# Patient Record
Sex: MALE | Race: WHITE | URBAN - METROPOLITAN AREA
[De-identification: names, ages, dates, MRNs, and addresses within clinical notes are randomized per-mention and may not be internally consistent; named-entity substitution may affect disease eponyms.]

---

## 2024-02-27 ENCOUNTER — APPOINTMENT (OUTPATIENT)
Dept: OPHTHALMOLOGY | Facility: CLINIC | Age: 74
End: 2024-02-27
Payer: MEDICARE

## 2024-03-11 ENCOUNTER — OFFICE VISIT (OUTPATIENT)
Dept: NEUROSURGERY | Facility: HOSPITAL | Age: 74
End: 2024-03-11
Payer: MEDICARE

## 2024-03-11 VITALS
WEIGHT: 191.8 LBS | TEMPERATURE: 97.8 F | HEART RATE: 74 BPM | BODY MASS INDEX: 27.46 KG/M2 | HEIGHT: 70 IN | SYSTOLIC BLOOD PRESSURE: 154 MMHG | DIASTOLIC BLOOD PRESSURE: 90 MMHG

## 2024-03-11 DIAGNOSIS — D35.2 PITUITARY ADENOMA (MULTI): Primary | ICD-10-CM

## 2024-03-11 PROCEDURE — 99213 OFFICE O/P EST LOW 20 MIN: CPT | Performed by: NURSE PRACTITIONER

## 2024-03-11 PROCEDURE — 99203 OFFICE O/P NEW LOW 30 MIN: CPT | Performed by: NURSE PRACTITIONER

## 2024-03-11 RX ORDER — ASPIRIN 81 MG/1
TABLET ORAL DAILY
COMMUNITY

## 2024-03-11 RX ORDER — AMLODIPINE BESYLATE 5 MG/1
5 TABLET ORAL DAILY
COMMUNITY

## 2024-03-11 RX ORDER — ATORVASTATIN CALCIUM 20 MG/1
20 TABLET, FILM COATED ORAL DAILY
COMMUNITY

## 2024-03-11 RX ORDER — MULTIVIT-MIN/IRON FUM/FOLIC AC 7.5 MG-4
1 TABLET ORAL DAILY
COMMUNITY

## 2024-03-11 NOTE — PROGRESS NOTES
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 Given findings patient was referred to neurosurgery and presents to clinic for NPV.     Has not been seen by endocrinology. He continues to follow with ophthalmology at [facility] Dr. Anne and has an upcoming appointment. No blurred, double vision, or peripheral vision loss. Intermittent headaches that typically do not linger but had one about a month ago that took over a day to go away. Denies any heat or cold intolerance. No gait instability. No lactation or hand growth / feet.             Objective      Vitals:   /90   Pulse 74   Temp 36.6 °C (97.8 °F)   Ht 1.778 m (5' 10\")   Wt 87 kg (191 lb 12.8 oz)   BMI 27.52 kg/m²         Physical Exam:    A&Ox3   Fluent speech   EOMI; FC x 4; PERRLA; right eye with mild erythema to conjunctiva    GUERRERO; strength 5/5; no drift   SILT   Gait steady   Face and shoulder shrug symmetrical     Relevant Results:    MRI Orbit / Face/ Neck 02/23/24: T1 hypointense, T2 hypointense heterogeneously enhancing mass of the sella involving the pituitary measuring 1.3 x 1.2 x 1.6cm and abuts the R cavernous sinus with no OC involvement.     MRV 02/23/24: Patent superior sagittal sinus, transverse, and sigmoid sinus.           Assessment & Plan      Diagnosis:  Diagnoses and all orders for this visit:  Pituitary adenoma (CMS/HCC)  -     Referral to Endocrinology; Future  -     MR sella w and wo IV contrast; Future        Provider Impressions:    Patient is a 73-year-old male presenting for evaluation after he was incidentally found to have a " pituitary adenoma while undergoing surveillance monitoring post CEA. CTA with concern for enlarge sella / pituitary gland. MRI orbits w/wo with a 1.3 x 1.2 x 1.6cm pituitary macroadenoma. Patient has a follow up with his ophthalmologist upcoming. Will request records from visit and ensure VF are being completed. If no concern for vision loss will plan for continued observation. He will be due for a repeat MRI sella protocol in 3 months to assess for stability and growth. In the meantime will have patient establish with endocrinology for formal workup. Plan discussed with patient and wife who were in agreement. All questions answered.         Medical History     No past medical history on file.  No past surgical history on file.     No family history on file.  Allergies   Allergen Reactions    Sudafed [Pseudoephedrine Hcl] Rash     Current Outpatient Medications   Medication Instructions    amLODIPine (NORVASC) 5 mg, oral, Daily    aspirin 81 mg EC tablet oral, Daily    atorvastatin (LIPITOR) 20 mg, oral, Daily    multivitamin with minerals tablet 1 tablet, oral, Daily

## 2024-03-11 NOTE — PATIENT INSTRUCTIONS
Welcome to Sylvia Mcmahon, CNP clinic. We are here to assist you through your Neurological Surgery care at [facility]. My office number is [phone]. This number is the most direct way to communicate with the office. The office fax number is [phone].     A MRI Sella w/wo  was ordered during your visit today. Please call [phone] to assist you in scheduling your imaging.  This will be due in May 2024.     You have been referred to an endocrinologist for further workup. We will assist you with scheduling this appointment as well as your MRI.

## 2024-03-13 ENCOUNTER — TELEPHONE (OUTPATIENT)
Dept: PRIMARY CARE | Facility: CLINIC | Age: 74
End: 2024-03-13
Payer: MEDICARE

## 2024-05-22 ENCOUNTER — HOSPITAL ENCOUNTER (OUTPATIENT)
Dept: RADIOLOGY | Facility: HOSPITAL | Age: 74
Discharge: HOME | End: 2024-05-22
Payer: MEDICARE

## 2024-05-22 VITALS — BODY MASS INDEX: 27.12 KG/M2 | WEIGHT: 189 LBS

## 2024-05-22 DIAGNOSIS — D35.2 PITUITARY ADENOMA (MULTI): ICD-10-CM

## 2024-05-22 PROCEDURE — A9575 INJ GADOTERATE MEGLUMI 0.1ML: HCPCS | Performed by: NURSE PRACTITIONER

## 2024-05-22 PROCEDURE — 2550000001 HC RX 255 CONTRASTS: Performed by: NURSE PRACTITIONER

## 2024-05-22 PROCEDURE — 70553 MRI BRAIN STEM W/O & W/DYE: CPT

## 2024-05-22 PROCEDURE — 70553 MRI BRAIN STEM W/O & W/DYE: CPT | Performed by: RADIOLOGY

## 2024-05-22 RX ORDER — GADOTERATE MEGLUMINE 376.9 MG/ML
18 INJECTION INTRAVENOUS
Status: COMPLETED | OUTPATIENT
Start: 2024-05-22 | End: 2024-05-22

## 2024-05-22 RX ADMIN — GADOTERATE MEGLUMINE 18 ML: 376.9 INJECTION INTRAVENOUS at 11:30

## 2024-07-28 PROBLEM — I48.20 CHRONIC ATRIAL FIBRILLATION (MULTI): Status: ACTIVE | Noted: 2017-12-15

## 2024-07-28 PROBLEM — N13.8 BPH WITH OBSTRUCTION/LOWER URINARY TRACT SYMPTOMS: Status: ACTIVE | Noted: 2018-02-05

## 2024-07-28 PROBLEM — I65.23 BILATERAL CAROTID ARTERY STENOSIS: Status: ACTIVE | Noted: 2018-08-23

## 2024-07-28 PROBLEM — N52.9 ERECTILE DYSFUNCTION: Status: ACTIVE | Noted: 2018-02-05

## 2024-07-28 PROBLEM — N40.1 BPH WITH OBSTRUCTION/LOWER URINARY TRACT SYMPTOMS: Status: ACTIVE | Noted: 2018-02-05

## 2024-08-02 ENCOUNTER — APPOINTMENT (OUTPATIENT)
Dept: ENDOCRINOLOGY | Facility: CLINIC | Age: 74
End: 2024-08-02
Payer: MEDICARE

## 2024-08-02 VITALS
DIASTOLIC BLOOD PRESSURE: 64 MMHG | HEART RATE: 76 BPM | WEIGHT: 184.75 LBS | BODY MASS INDEX: 26.45 KG/M2 | RESPIRATION RATE: 16 BRPM | HEIGHT: 70 IN | SYSTOLIC BLOOD PRESSURE: 122 MMHG

## 2024-08-02 DIAGNOSIS — D35.2 PITUITARY ADENOMA (MULTI): ICD-10-CM

## 2024-08-02 PROCEDURE — 1160F RVW MEDS BY RX/DR IN RCRD: CPT | Performed by: INTERNAL MEDICINE

## 2024-08-02 PROCEDURE — 1159F MED LIST DOCD IN RCRD: CPT | Performed by: INTERNAL MEDICINE

## 2024-08-02 PROCEDURE — 99204 OFFICE O/P NEW MOD 45 MIN: CPT | Performed by: INTERNAL MEDICINE

## 2024-08-02 PROCEDURE — 1036F TOBACCO NON-USER: CPT | Performed by: INTERNAL MEDICINE

## 2024-08-02 RX ORDER — LOSARTAN POTASSIUM AND HYDROCHLOROTHIAZIDE 25; 100 MG/1; MG/1
1 TABLET ORAL
COMMUNITY

## 2024-08-02 RX ORDER — SULFASALAZINE 500 MG/1
TABLET ORAL
COMMUNITY

## 2024-08-02 RX ORDER — METOPROLOL SUCCINATE 25 MG/1
TABLET, EXTENDED RELEASE ORAL
COMMUNITY
Start: 2024-05-16

## 2024-08-02 RX ORDER — ATORVASTATIN CALCIUM 80 MG/1
80 TABLET, FILM COATED ORAL
COMMUNITY

## 2024-08-02 RX ORDER — OMEPRAZOLE 40 MG/1
CAPSULE, DELAYED RELEASE ORAL
COMMUNITY

## 2024-08-02 RX ORDER — FINASTERIDE 5 MG/1
5 TABLET, FILM COATED ORAL
COMMUNITY
Start: 2022-11-26

## 2024-08-02 RX ORDER — TAMSULOSIN HYDROCHLORIDE 0.4 MG/1
0.4 CAPSULE ORAL NIGHTLY
COMMUNITY

## 2024-08-02 ASSESSMENT — ENCOUNTER SYMPTOMS
LIGHT-HEADEDNESS: 0
FEVER: 0
CHILLS: 0
DIARRHEA: 0
DIZZINESS: 0
NAUSEA: 0
SHORTNESS OF BREATH: 0
VOMITING: 0

## 2024-08-02 NOTE — PROGRESS NOTES
Endocrinology New Patient Consult  Subjective   Patient ID: Sunny Saul is a 74 y.o. male who presents for Pituitary Problem. Patient was referred by Sylvia Mcmahon CNP    PCP: Arcadio Donnelly MD    HPI  74-year-old referred by VERENICE Mcmahon CNP for evaluation of pituitary mass.  Patient was found to have an incidental pituitary lesion measuring 15 x 13 mm after routine CT scan for follow-up on vascular issues status post CEA.  He denies any previous knowledge of pituitary lesion.  He was seen by neurosurgery who has plans to repeat his imaging in 3 months.  He has yet to have hormonal blood work.  He has been feeling well most of the time.  His energy level is good.  Sleeping can be erratic which is a chronic issue.  His weight has been stable.  He denies any palpitations sweats or hot flashes.  He has not noticed any change in his ring or shoe sizes.  He has had occasional headaches maybe once a week or so over the last several months that resolved with medication over-the-counter.  He denies any visual issues and is scheduled to see his eye doctor next week.     Review of Systems   Constitutional:  Negative for chills and fever.   Respiratory:  Negative for shortness of breath.    Gastrointestinal:  Negative for diarrhea, nausea and vomiting.   Endocrine: Negative for cold intolerance and heat intolerance.   Neurological:  Negative for dizziness and light-headedness.       Patient Active Problem List   Diagnosis    Bilateral carotid artery stenosis    BPH with obstruction/lower urinary tract symptoms    Chronic atrial fibrillation (Multi)    Erectile dysfunction       No past medical history on file.     No past surgical history on file.     Social History     Tobacco Use   Smoking Status Never   Smokeless Tobacco Never      Social History     Substance and Sexual Activity   Alcohol Use None      Marital status:   Employment: Retired     No family history on file.     Home Meds:  Current  "Outpatient Medications   Medication Instructions    amLODIPine (NORVASC) 5 mg, oral, Daily    aspirin 81 mg EC tablet oral, Daily    atorvastatin (LIPITOR) 20 mg, oral, Daily    atorvastatin (LIPITOR) 80 mg, oral, Daily RT    finasteride (PROSCAR) 5 mg, oral, Daily RT    losartan-hydrochlorothiazide (Hyzaar) 100-25 mg tablet 1 tablet, oral, Daily RT    metoprolol succinate XL (Toprol-XL) 25 mg 24 hr tablet     multivitamin with minerals tablet 1 tablet, oral, Daily    omeprazole (PriLOSEC) 40 mg DR capsule oral    sulfaSALAzine (Azulfidine) 500 mg tablet TAKE 2 TAB(S) ORALLY THREE TIMES DAILY FOR 7 DAYS    tamsulosin (FLOMAX) 0.4 mg, oral, Nightly        Allergies   Allergen Reactions    Sudafed [Pseudoephedrine Hcl] Rash        Objective   Vitals:    08/02/24 1355   BP: 122/64   Pulse: 76   Resp: 16      Vitals:    08/02/24 1355   Weight: 83.8 kg (184 lb 11.9 oz)      Body mass index is 26.51 kg/m².   Physical Exam  Constitutional:       Appearance: Normal appearance. He is overweight.   HENT:      Head: Normocephalic and atraumatic.   Neck:      Thyroid: No thyroid mass, thyromegaly or thyroid tenderness.   Cardiovascular:      Rate and Rhythm: Normal rate and regular rhythm.      Heart sounds: No murmur heard.     No gallop.   Pulmonary:      Effort: Pulmonary effort is normal.      Breath sounds: Normal breath sounds.   Abdominal:      Palpations: Abdomen is soft.      Comments: benign   Neurological:      General: No focal deficit present.      Mental Status: He is alert and oriented to person, place, and time.      Deep Tendon Reflexes: Reflexes are normal and symmetric.   Psychiatric:         Behavior: Behavior is cooperative.         Labs:  No results found for: \"CMPLAS\", \"HGBA1C\", \"A1CTI\", \"LDLDIRECT\", \"TSH\", \"FREET4\", \"QOXNC4Q\"   No results found for: \"PR1\", \"THYROIDPAB\", \"TSI\"     Assessment/Plan   Assessment & Plan  Pituitary adenoma (Multi)  74-year-old here for evaluation of incidental pituitary " macroadenoma.  We discussed his course.  We reviewed the pituitary gland and its functions today.  We discussed issues with pituitary enlargement including possible effect on vision and normal hormone function.  We also discussed issues with hormonal overproduction by tumors of the pituitary gland.  We will start with a full morning pituitary panel and then go from there as far as further evaluation and treatment.  I encouraged him to call or message with any concerns or questions.  Orders:    Referral to Endocrinology      Electronically signed by:  Aisha Gillette MD 08/02/24  2:00 PM

## 2024-08-02 NOTE — PATIENT INSTRUCTIONS
Morning blood work in the near future as discussed  Please call or message with any concerns or questions  Further plans based on test results

## 2024-08-02 NOTE — LETTER
August 2, 2024     CORRY Garcia  [address]  Department Of Neurological Surgery  [facility] [address]    Patient: Sunny Saul   YOB: 1950   Date of Visit: 8/2/2024       Dear CORRY Vail:    Thank you for referring Sunny Saul to me for evaluation. Below are my notes for this consultation.  If you have questions, please do not hesitate to call me. I look forward to following your patient along with you.       Sincerely,     Aisha Gillette MD      CC: No Recipients  ______________________________________________________________________________________    Endocrinology New Patient Consult  Subjective  Patient ID: Sunny Saul is a 74 y.o. male who presents for Pituitary Problem. Patient was referred by Sylvia Mcmahon CNP    PCP: Arcadio Donnelly MD    HPI  74-year-old referred by VERENICE Mcmahon CNP for evaluation of pituitary mass.  Patient was found to have an incidental pituitary lesion measuring 15 x 13 mm after routine CT scan for follow-up on vascular issues status post CEA.  He denies any previous knowledge of pituitary lesion.  He was seen by neurosurgery who has plans to repeat his imaging in 3 months.  He has yet to have hormonal blood work.  He has been feeling well most of the time.  His energy level is good.  Sleeping can be erratic which is a chronic issue.  His weight has been stable.  He denies any palpitations sweats or hot flashes.  He has not noticed any change in his ring or shoe sizes.  He has had occasional headaches maybe once a week or so over the last several months that resolved with medication over-the-counter.  He denies any visual issues and is scheduled to see his eye doctor next week.     Review of Systems   Constitutional:  Negative for chills and fever.   Respiratory:  Negative for shortness of breath.    Gastrointestinal:  Negative for diarrhea, nausea and vomiting.   Endocrine: Negative for cold intolerance and heat  intolerance.   Neurological:  Negative for dizziness and light-headedness.       Patient Active Problem List   Diagnosis   • Bilateral carotid artery stenosis   • BPH with obstruction/lower urinary tract symptoms   • Chronic atrial fibrillation (Multi)   • Erectile dysfunction       No past medical history on file.     No past surgical history on file.     Social History     Tobacco Use   Smoking Status Never   Smokeless Tobacco Never      Social History     Substance and Sexual Activity   Alcohol Use None      Marital status:   Employment: Retired     No family history on file.     Home Meds:  Current Outpatient Medications   Medication Instructions   • amLODIPine (NORVASC) 5 mg, oral, Daily   • aspirin 81 mg EC tablet oral, Daily   • atorvastatin (LIPITOR) 20 mg, oral, Daily   • atorvastatin (LIPITOR) 80 mg, oral, Daily RT   • finasteride (PROSCAR) 5 mg, oral, Daily RT   • losartan-hydrochlorothiazide (Hyzaar) 100-25 mg tablet 1 tablet, oral, Daily RT   • metoprolol succinate XL (Toprol-XL) 25 mg 24 hr tablet    • multivitamin with minerals tablet 1 tablet, oral, Daily   • omeprazole (PriLOSEC) 40 mg DR capsule oral   • sulfaSALAzine (Azulfidine) 500 mg tablet TAKE 2 TAB(S) ORALLY THREE TIMES DAILY FOR 7 DAYS   • tamsulosin (FLOMAX) 0.4 mg, oral, Nightly        Allergies   Allergen Reactions   • Sudafed [Pseudoephedrine Hcl] Rash        Objective  Vitals:    08/02/24 1355   BP: 122/64   Pulse: 76   Resp: 16      Vitals:    08/02/24 1355   Weight: 83.8 kg (184 lb 11.9 oz)      Body mass index is 26.51 kg/m².   Physical Exam  Constitutional:       Appearance: Normal appearance. He is overweight.   HENT:      Head: Normocephalic and atraumatic.   Neck:      Thyroid: No thyroid mass, thyromegaly or thyroid tenderness.   Cardiovascular:      Rate and Rhythm: Normal rate and regular rhythm.      Heart sounds: No murmur heard.     No gallop.   Pulmonary:      Effort: Pulmonary effort is normal.       "Breath sounds: Normal breath sounds.   Abdominal:      Palpations: Abdomen is soft.      Comments: benign   Neurological:      General: No focal deficit present.      Mental Status: He is alert and oriented to person, place, and time.      Deep Tendon Reflexes: Reflexes are normal and symmetric.   Psychiatric:         Behavior: Behavior is cooperative.         Labs:  No results found for: \"CMPLAS\", \"HGBA1C\", \"A1CTI\", \"LDLDIRECT\", \"TSH\", \"FREET4\", \"DHSDN4K\"   No results found for: \"PR1\", \"THYROIDPAB\", \"TSI\"     Assessment/Plan  Assessment & Plan  Pituitary adenoma (Multi)  74-year-old here for evaluation of incidental pituitary macroadenoma.  We discussed his course.  We reviewed the pituitary gland and its functions today.  We discussed issues with pituitary enlargement including possible effect on vision and normal hormone function.  We also discussed issues with hormonal overproduction by tumors of the pituitary gland.  We will start with a full morning pituitary panel and then go from there as far as further evaluation and treatment.  I encouraged him to call or message with any concerns or questions.  Orders:  •  Referral to Endocrinology      Electronically signed by:  Aisha Gillette MD 08/02/24  2:00 PM    "

## 2024-08-05 ENCOUNTER — LAB (OUTPATIENT)
Dept: LAB | Facility: LAB | Age: 74
End: 2024-08-05
Payer: MEDICARE

## 2024-08-05 DIAGNOSIS — D35.2 PITUITARY ADENOMA (MULTI): ICD-10-CM

## 2024-08-05 LAB
CORTIS AM PEAK SERPL-MSCNC: 16.8 UG/DL (ref 5–20)
FSH SERPL-ACNC: 32.2 IU/L
LH SERPL-ACNC: 14.1 IU/L
PROLACTIN SERPL-MCNC: 6.7 UG/L (ref 2–18)
T3FREE SERPL-MCNC: 2.8 PG/ML (ref 2.3–4.2)
T4 FREE SERPL-MCNC: 1 NG/DL (ref 0.61–1.12)
TSH SERPL-ACNC: 3.51 MIU/L (ref 0.44–3.98)

## 2024-08-05 PROCEDURE — 83519 RIA NONANTIBODY: CPT

## 2024-08-05 PROCEDURE — 82024 ASSAY OF ACTH: CPT

## 2024-08-05 PROCEDURE — 83001 ASSAY OF GONADOTROPIN (FSH): CPT

## 2024-08-05 PROCEDURE — 84481 FREE ASSAY (FT-3): CPT

## 2024-08-05 PROCEDURE — 84305 ASSAY OF SOMATOMEDIN: CPT

## 2024-08-05 PROCEDURE — 84439 ASSAY OF FREE THYROXINE: CPT

## 2024-08-05 PROCEDURE — 84146 ASSAY OF PROLACTIN: CPT

## 2024-08-05 PROCEDURE — 84402 ASSAY OF FREE TESTOSTERONE: CPT

## 2024-08-05 PROCEDURE — 83002 ASSAY OF GONADOTROPIN (LH): CPT

## 2024-08-05 PROCEDURE — 84443 ASSAY THYROID STIM HORMONE: CPT

## 2024-08-05 PROCEDURE — 82533 TOTAL CORTISOL: CPT

## 2024-08-05 PROCEDURE — 36415 COLL VENOUS BLD VENIPUNCTURE: CPT

## 2024-08-07 LAB
ACTH PLAS-MCNC: 67.1 PG/ML (ref 7.2–63.3)
IGF-I SERPL-MCNC: 76 NG/ML (ref 22–221)
IGF-I Z-SCORE SERPL: -0.6

## 2024-08-09 LAB
TESTOSTERONE FREE (CHAN): 74.8 PG/ML (ref 30–135)
TESTOSTERONE,TOTAL,LC-MS/MS: 584 NG/DL (ref 250–1100)

## 2024-08-15 LAB — ALPHA SUBUNIT: 0.2 NG/ML

## 2024-08-18 DIAGNOSIS — D35.2 PITUITARY ADENOMA (MULTI): Primary | ICD-10-CM

## 2024-10-01 ENCOUNTER — HOSPITAL ENCOUNTER (OUTPATIENT)
Dept: RADIOLOGY | Facility: HOSPITAL | Age: 74
Discharge: HOME | End: 2024-10-01
Payer: MEDICARE

## 2024-10-01 DIAGNOSIS — D35.2 PITUITARY ADENOMA (MULTI): ICD-10-CM

## 2024-10-01 PROCEDURE — A9575 INJ GADOTERATE MEGLUMI 0.1ML: HCPCS | Performed by: NURSE PRACTITIONER

## 2024-10-01 PROCEDURE — 70553 MRI BRAIN STEM W/O & W/DYE: CPT | Performed by: RADIOLOGY

## 2024-10-01 PROCEDURE — 70553 MRI BRAIN STEM W/O & W/DYE: CPT

## 2024-10-01 PROCEDURE — 2550000001 HC RX 255 CONTRASTS: Performed by: NURSE PRACTITIONER

## 2024-10-01 RX ORDER — GADOTERATE MEGLUMINE 376.9 MG/ML
17 INJECTION INTRAVENOUS
Status: COMPLETED | OUTPATIENT
Start: 2024-10-01 | End: 2024-10-01

## 2025-02-14 ENCOUNTER — APPOINTMENT (OUTPATIENT)
Dept: ENDOCRINOLOGY | Facility: CLINIC | Age: 75
End: 2025-02-14
Payer: MEDICARE

## 2025-02-14 ENCOUNTER — TELEPHONE (OUTPATIENT)
Dept: PRIMARY CARE | Facility: CLINIC | Age: 75
End: 2025-02-14

## 2025-03-07 ENCOUNTER — APPOINTMENT (OUTPATIENT)
Facility: CLINIC | Age: 75
End: 2025-03-07
Payer: MEDICARE

## 2025-03-07 ENCOUNTER — TELEPHONE (OUTPATIENT)
Dept: PRIMARY CARE | Facility: CLINIC | Age: 75
End: 2025-03-07

## 2025-03-07 VITALS
RESPIRATION RATE: 16 BRPM | HEIGHT: 70 IN | HEART RATE: 78 BPM | DIASTOLIC BLOOD PRESSURE: 70 MMHG | BODY MASS INDEX: 25.43 KG/M2 | WEIGHT: 177.6 LBS | SYSTOLIC BLOOD PRESSURE: 120 MMHG

## 2025-03-07 DIAGNOSIS — D35.2 PITUITARY ADENOMA (MULTI): Primary | ICD-10-CM

## 2025-03-07 PROCEDURE — 1036F TOBACCO NON-USER: CPT | Performed by: INTERNAL MEDICINE

## 2025-03-07 PROCEDURE — G2211 COMPLEX E/M VISIT ADD ON: HCPCS | Performed by: INTERNAL MEDICINE

## 2025-03-07 PROCEDURE — 1160F RVW MEDS BY RX/DR IN RCRD: CPT | Performed by: INTERNAL MEDICINE

## 2025-03-07 PROCEDURE — 1159F MED LIST DOCD IN RCRD: CPT | Performed by: INTERNAL MEDICINE

## 2025-03-07 PROCEDURE — 99213 OFFICE O/P EST LOW 20 MIN: CPT | Performed by: INTERNAL MEDICINE

## 2025-03-07 RX ORDER — CLOPIDOGREL BISULFATE 75 MG/1
TABLET ORAL DAILY
COMMUNITY

## 2025-03-07 ASSESSMENT — ENCOUNTER SYMPTOMS
VOMITING: 0
LIGHT-HEADEDNESS: 0
SHORTNESS OF BREATH: 0
NAUSEA: 0
DIARRHEA: 0
FEVER: 0
DIZZINESS: 0
CHILLS: 0

## 2025-03-07 NOTE — PROGRESS NOTES
Endocrinology: Follow up visit  Subjective   Patient ID: Sunny Saul is a 74 y.o. male who presents for Pituitary Problem.    PCP: Arcadio Donnelly MD    HPI  Here for follow up pituitary macroadenoma  Last seen 6 months ago  Labs at that time all unremarkable  Mri in oct for neuro surg stable    Notes headache on/off for the last 4 months  Nagging and usually daily, new for him    Had mi recently and now on plavix, no stent needed  Beyond headache no complaints  Review of Systems   Constitutional:  Negative for chills and fever.   Respiratory:  Negative for shortness of breath.    Gastrointestinal:  Negative for diarrhea, nausea and vomiting.   Endocrine: Negative for cold intolerance and heat intolerance.   Neurological:  Negative for dizziness and light-headedness.       Patient Active Problem List   Diagnosis    Bilateral carotid artery stenosis    BPH with obstruction/lower urinary tract symptoms    Chronic atrial fibrillation (Multi)    Erectile dysfunction        Home Meds:  Current Outpatient Medications   Medication Instructions    amLODIPine (NORVASC) 5 mg, oral, Daily    aspirin 81 mg EC tablet oral, Daily    atorvastatin (LIPITOR) 20 mg, oral, Daily    atorvastatin (LIPITOR) 80 mg, oral, Daily RT    finasteride (PROSCAR) 5 mg, oral, Daily RT    losartan-hydrochlorothiazide (Hyzaar) 100-25 mg tablet 1 tablet, oral, Daily RT    metoprolol succinate XL (Toprol-XL) 25 mg 24 hr tablet     multivitamin with minerals tablet 1 tablet, oral, Daily    omeprazole (PriLOSEC) 40 mg DR capsule oral    sulfaSALAzine (Azulfidine) 500 mg tablet TAKE 2 TAB(S) ORALLY THREE TIMES DAILY FOR 7 DAYS    tamsulosin (FLOMAX) 0.4 mg, oral, Nightly        Allergies   Allergen Reactions    Sudafed [Pseudoephedrine Hcl] Rash        Objective   Vitals:    03/07/25 1505   BP: 120/70   Pulse: 78   Resp: 16      Vitals:    03/07/25 1505   Weight: 80.6 kg (177 lb 9.6 oz)      Body mass index is 25.48 kg/m².   Physical  "Exam  Constitutional:       Appearance: Normal appearance. He is normal weight.   HENT:      Head: Normocephalic and atraumatic.   Neck:      Thyroid: No thyroid mass, thyromegaly or thyroid tenderness.   Cardiovascular:      Rate and Rhythm: Normal rate and regular rhythm.      Heart sounds: No murmur heard.     No gallop.   Pulmonary:      Effort: Pulmonary effort is normal.      Breath sounds: Normal breath sounds.   Abdominal:      Palpations: Abdomen is soft.      Comments: benign   Neurological:      General: No focal deficit present.      Mental Status: He is alert and oriented to person, place, and time.      Deep Tendon Reflexes: Reflexes are normal and symmetric.   Psychiatric:         Behavior: Behavior is cooperative.         Labs:  Lab Results   Component Value Date    TSH 3.51 08/05/2024    FREET4 1.00 08/05/2024      No results found for: \"PR1\", \"THYROIDPAB\", \"TSI\"     Assessment/Plan   Assessment & Plan  Pituitary adenoma (Multi)  Discussed course  Labs when able but advised to call neurosurg regarding nagging headaches  Follow up in one year  Orders:    Comprehensive Metabolic Panel; Future    Thyroid Stimulating Hormone; Future    Thyroxine, Free; Future    Triiodothyronine, Free; Future    Prolactin; Future        Electronically signed by:  Aisha Gillette MD 03/07/25 3:05 PM              "

## 2025-03-07 NOTE — LETTER
March 7, 2025     Sylvia Mcmahon, APRN-CNP  [address]  Department Of Neurological Surgery  [facility] [address]    Patient: Sunny Saul   YOB: 1950   Date of Visit: 3/7/2025       Dear Dr. Sylvia Mcmahon, APRN-CNP:    Thank you for referring Sunny Saul to me for evaluation. Below are my notes for this consultation.  If you have questions, please do not hesitate to call me. I look forward to following your patient along with you.       Sincerely,     Aisha Gillette MD      CC: No Recipients  ______________________________________________________________________________________    Endocrinology: Follow up visit  Subjective  Patient ID: Sunny Saul is a 74 y.o. male who presents for Pituitary Problem.    PCP: Arcadio Donnelly MD    HPI  Here for follow up pituitary macroadenoma  Last seen 6 months ago  Labs at that time all unremarkable  Mri in oct for neuro surg stable    Notes headache on/off for the last 4 months  Nagging and usually daily, new for him    Had mi recently and now on plavix, no stent needed  Beyond headache no complaints  Review of Systems   Constitutional:  Negative for chills and fever.   Respiratory:  Negative for shortness of breath.    Gastrointestinal:  Negative for diarrhea, nausea and vomiting.   Endocrine: Negative for cold intolerance and heat intolerance.   Neurological:  Negative for dizziness and light-headedness.       Patient Active Problem List   Diagnosis   • Bilateral carotid artery stenosis   • BPH with obstruction/lower urinary tract symptoms   • Chronic atrial fibrillation (Multi)   • Erectile dysfunction        Home Meds:  Current Outpatient Medications   Medication Instructions   • amLODIPine (NORVASC) 5 mg, oral, Daily   • aspirin 81 mg EC tablet oral, Daily   • atorvastatin (LIPITOR) 20 mg, oral, Daily   • atorvastatin (LIPITOR) 80 mg, oral, Daily RT   • finasteride (PROSCAR) 5 mg, oral, Daily RT   • losartan-hydrochlorothiazide  "(Hyzaar) 100-25 mg tablet 1 tablet, oral, Daily RT   • metoprolol succinate XL (Toprol-XL) 25 mg 24 hr tablet    • multivitamin with minerals tablet 1 tablet, oral, Daily   • omeprazole (PriLOSEC) 40 mg DR capsule oral   • sulfaSALAzine (Azulfidine) 500 mg tablet TAKE 2 TAB(S) ORALLY THREE TIMES DAILY FOR 7 DAYS   • tamsulosin (FLOMAX) 0.4 mg, oral, Nightly        Allergies   Allergen Reactions   • Sudafed [Pseudoephedrine Hcl] Rash        Objective  Vitals:    03/07/25 1505   BP: 120/70   Pulse: 78   Resp: 16      Vitals:    03/07/25 1505   Weight: 80.6 kg (177 lb 9.6 oz)      Body mass index is 25.48 kg/m².   Physical Exam  Constitutional:       Appearance: Normal appearance. He is normal weight.   HENT:      Head: Normocephalic and atraumatic.   Neck:      Thyroid: No thyroid mass, thyromegaly or thyroid tenderness.   Cardiovascular:      Rate and Rhythm: Normal rate and regular rhythm.      Heart sounds: No murmur heard.     No gallop.   Pulmonary:      Effort: Pulmonary effort is normal.      Breath sounds: Normal breath sounds.   Abdominal:      Palpations: Abdomen is soft.      Comments: benign   Neurological:      General: No focal deficit present.      Mental Status: He is alert and oriented to person, place, and time.      Deep Tendon Reflexes: Reflexes are normal and symmetric.   Psychiatric:         Behavior: Behavior is cooperative.         Labs:  Lab Results   Component Value Date    TSH 3.51 08/05/2024    FREET4 1.00 08/05/2024      No results found for: \"PR1\", \"THYROIDPAB\", \"TSI\"     Assessment/Plan  Assessment & Plan  Pituitary adenoma (Multi)  Discussed course  Labs when able but advised to call neurosurg regarding nagging headaches  Follow up in one year  Orders:  •  Comprehensive Metabolic Panel; Future  •  Thyroid Stimulating Hormone; Future  •  Thyroxine, Free; Future  •  Triiodothyronine, Free; Future  •  Prolactin; Future        Electronically signed by:  Aisha Gillette MD 03/07/25 3:05 PM     "

## 2026-03-13 ENCOUNTER — APPOINTMENT (OUTPATIENT)
Facility: CLINIC | Age: 76
End: 2026-03-13
Payer: MEDICARE